# Patient Record
Sex: MALE | Race: WHITE
[De-identification: names, ages, dates, MRNs, and addresses within clinical notes are randomized per-mention and may not be internally consistent; named-entity substitution may affect disease eponyms.]

---

## 2020-07-17 ENCOUNTER — HOSPITAL ENCOUNTER (EMERGENCY)
Dept: HOSPITAL 47 - EC | Age: 40
Discharge: HOME | End: 2020-07-17
Payer: OTHER GOVERNMENT

## 2020-07-17 VITALS
DIASTOLIC BLOOD PRESSURE: 80 MMHG | RESPIRATION RATE: 18 BRPM | TEMPERATURE: 98.8 F | SYSTOLIC BLOOD PRESSURE: 118 MMHG | HEART RATE: 82 BPM

## 2020-07-17 DIAGNOSIS — F17.200: ICD-10-CM

## 2020-07-17 DIAGNOSIS — Z91.030: ICD-10-CM

## 2020-07-17 DIAGNOSIS — T63.441A: Primary | ICD-10-CM

## 2020-07-17 PROCEDURE — 99283 EMERGENCY DEPT VISIT LOW MDM: CPT

## 2020-07-17 NOTE — ED
General Adult HPI





- General


Chief complaint: Recheck/Abnormal Lab/Rx


Stated complaint: Weakness


Time Seen by Provider: 07/17/20 16:45


Source: patient, RN notes reviewed


Mode of arrival: ambulatory


Limitations: no limitations





- History of Present Illness


Initial comments: 





Patient is a pleasant 39-year-old male presenting to the emergency Department 

with bee sting.  Patient states around 3:15 he struck a pole and AP came out and

stung him in the left upper eyelid as well as left arm.  Patient took an EpiPen 

injection around 3:30.  Patient did feel a little shaky following the EpiPen.  

Patient then went to urgent care and was given Benadryl 50 mg IM as well as 

Solu-Medrol 125 mg IM.  Patient was then sent here for monitoring.  Patient 

states he feels fine at this time and only has minimal discomfort of the 2 

areas.  Patient states he did have an EpiPen secondary to previous bee sting 

years ago that had associated facial swelling.  Bee sting at that time was in 

the back of the head.  Patient denies ever having any difficulty breathing or 

swelling of the tongue or throat or lips.





- Related Data


                                Home Medications











 Medication  Instructions  Recorded  Confirmed


 


EPINEPHrine [Epipen 2-Dwight] 0.3 mg IM ONCE PRN 06/11/15 06/11/15








                                  Previous Rx's











 Medication  Instructions  Recorded


 


predniSONE [Deltasone] 20 mg PO BID #10 tab 07/17/20











                                    Allergies











Allergy/AdvReac Type Severity Reaction Status Date / Time


 


venom-honey bee Allergy  Anaphylaxis Verified 07/17/20 16:43





[bee venom (honey bee)]     














Review of Systems


ROS Statement: 


Those systems with pertinent positive or pertinent negative responses have been 

documented in the HPI.





ROS Other: All systems not noted in ROS Statement are negative.


Constitutional: Denies: fever


Eyes: Reports: as per HPI.  Denies: eye pain


ENT: Denies: ear pain


Respiratory: Denies: cough, dyspnea


Cardiovascular: Denies: chest pain


Endocrine: Denies: fatigue


Gastrointestinal: Denies: abdominal pain


Genitourinary: Denies: dysuria


Musculoskeletal: Denies: back pain


Skin: Reports: as per HPI


Neurological: Denies: weakness





Past Medical History


Past Medical History: No Reported History


History of Any Multi-Drug Resistant Organisms: None Reported


Past Surgical History: Adenoidectomy


Past Psychological History: No Psychological Hx Reported


Smoking Status: Current every day smoker


Past Alcohol Use History: Occasional


Past Drug Use History: Marijuana





General Exam


Limitations: no limitations


General appearance: alert, in no apparent distress


Head exam: Present: normocephalic


Eye exam: Present: PERRL, EOMI, other (Left upper eyelid with trace erythema.  

There is also a probable tiny puncture.)


ENT exam: Present: normal oropharynx, other (No signs of angioedema)


Neck exam: Present: normal inspection


Respiratory exam: Present: normal lung sounds bilaterally.  Absent: respiratory 

distress, wheezes, rales, rhonchi, stridor, accessory muscle use


Cardiovascular Exam: Present: regular rate, normal rhythm


GI/Abdominal exam: Present: soft.  Absent: tenderness


Extremities exam: Present: normal inspection


Neurological exam: Present: alert


Psychiatric exam: Present: normal affect, normal mood


Skin exam: Present: rash (Trace erythema and puncture left tricep region.  

Similar left upper eyelid.)





Course


                                   Vital Signs











  07/17/20





  16:40


 


Temperature 98.1 F


 


Pulse Rate 81


 


Respiratory 20





Rate 


 


Blood Pressure 127/84


 


O2 Sat by Pulse 97





Oximetry 














Medical Decision Making





- Medical Decision Making





Patient reevaluated and resting comfortably in bed.  Patient remained symptom-

free and is comfortable with discharge home.  Patient updated.





Disposition


Clinical Impression: 


 Hymenoptera sting





Disposition: HOME SELF-CARE


Condition: Stable


Instructions (If sedation given, give patient instructions):  Insect Bite or 

Sting (ED)


Additional Instructions: 


Please follow-up with primary care physician in the next day or 2 for recheck.  

Return for focal to breathing, swelling of the throat or tongue or lips, fevers,

increased redness or swelling, worsening symptoms or other concerns.  

Over-the-counter antihistamine for the next 5 days: Claritin or Allegra or 

Benadryl.  Steroid prescription written.





Prescriptions sent to [facility] in [place]




















Prescriptions: 


predniSONE [Deltasone] 20 mg PO BID #10 tab


Is patient prescribed a controlled substance at d/c from ED?: No


Referrals: 


CHAUNCEY Leonard III, MD [STAFF PHYSICIAN] - 1-2 days


Time of Disposition: 17:25